# Patient Record
Sex: FEMALE | Race: WHITE | NOT HISPANIC OR LATINO | Employment: FULL TIME | URBAN - METROPOLITAN AREA
[De-identification: names, ages, dates, MRNs, and addresses within clinical notes are randomized per-mention and may not be internally consistent; named-entity substitution may affect disease eponyms.]

---

## 2020-02-17 ENCOUNTER — HOSPITAL ENCOUNTER (EMERGENCY)
Facility: HOSPITAL | Age: 35
Discharge: HOME | End: 2020-02-17
Admitting: EMERGENCY MEDICINE

## 2020-02-17 ENCOUNTER — APPOINTMENT (OUTPATIENT)
Dept: GENERAL RADIOLOGY | Facility: HOSPITAL | Age: 35
End: 2020-02-17

## 2020-02-17 VITALS
DIASTOLIC BLOOD PRESSURE: 74 MMHG | WEIGHT: 255.73 LBS | OXYGEN SATURATION: 100 % | TEMPERATURE: 97.5 F | SYSTOLIC BLOOD PRESSURE: 113 MMHG | BODY MASS INDEX: 42.61 KG/M2 | HEIGHT: 65 IN | RESPIRATION RATE: 18 BRPM | HEART RATE: 90 BPM

## 2020-02-17 DIAGNOSIS — S80.01XA CONTUSION OF RIGHT KNEE, INITIAL ENCOUNTER: ICD-10-CM

## 2020-02-17 DIAGNOSIS — S20.219A CONTUSION OF CHEST WALL, UNSPECIFIED LATERALITY, INITIAL ENCOUNTER: ICD-10-CM

## 2020-02-17 DIAGNOSIS — M25.561 ACUTE PAIN OF RIGHT KNEE: ICD-10-CM

## 2020-02-17 DIAGNOSIS — R07.89 CHEST WALL PAIN: ICD-10-CM
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
## 2020-02-17 PROCEDURE — 71045 X-RAY EXAM CHEST 1 VIEW: CPT

## 2020-02-17 PROCEDURE — 73562 X-RAY EXAM OF KNEE 3: CPT

## 2020-02-17 PROCEDURE — 99283 EMERGENCY DEPT VISIT LOW MDM: CPT

## 2020-02-17 RX ORDER — KETOROLAC TROMETHAMINE 30 MG/ML
30 INJECTION, SOLUTION INTRAMUSCULAR; INTRAVENOUS ONCE
Status: DISCONTINUED | OUTPATIENT
Start: 2020-02-17 | End: 2020-02-17

## 2020-02-17 RX ORDER — NAPROXEN 500 MG/1
500 TABLET ORAL 2 TIMES DAILY WITH MEALS
Qty: 20 TABLET | Refills: 0 | OUTPATIENT
Start: 2020-02-17 | End: 2020-04-23

## 2020-02-17 RX ORDER — CYCLOBENZAPRINE HCL 10 MG
10 TABLET ORAL 3 TIMES DAILY PRN
COMMUNITY
End: 2020-04-23

## 2020-02-17 RX ORDER — IBUPROFEN 400 MG/1
800 TABLET ORAL ONCE
Status: COMPLETED | OUTPATIENT
Start: 2020-02-17 | End: 2020-02-17

## 2020-02-17 RX ADMIN — IBUPROFEN 800 MG: 400 TABLET ORAL at 17:29
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
## 2020-02-17 NOTE — DISCHARGE INSTRUCTIONS
Apply ice and elevate right knee for 20 minutes at a time for the neck 72 hours help with pain and swelling.  You may also apply ice to your chest wall to help with pain and swelling.    Take naproxen as needed for pain do not mix with other NSAID such as ibuprofen diclofenac or Aleve.  Also take your Flexeril as needed for muscle spasms and pain.    Follow-up with your primary care provider in 3-5 days.  If you do not have a primary care provider call [phone] for help in finding one, or you may follow up with [facility] at [phone].    Return to ED for any new or worsening symptoms
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
## 2020-04-23 ENCOUNTER — HOSPITAL ENCOUNTER (EMERGENCY)
Facility: HOSPITAL | Age: 35
Discharge: HOME | End: 2020-04-23
Admitting: EMERGENCY MEDICINE

## 2020-04-23 VITALS
SYSTOLIC BLOOD PRESSURE: 115 MMHG | BODY MASS INDEX: 44.33 KG/M2 | WEIGHT: 266.1 LBS | HEIGHT: 65 IN | RESPIRATION RATE: 14 BRPM | DIASTOLIC BLOOD PRESSURE: 70 MMHG | HEART RATE: 90 BPM | TEMPERATURE: 98 F | OXYGEN SATURATION: 99 %

## 2020-04-23 DIAGNOSIS — M54.42 ACUTE LEFT-SIDED LOW BACK PAIN WITH LEFT-SIDED SCIATICA: Primary | ICD-10-CM

## 2020-04-23 LAB
BACTERIA UR QL AUTO: ABNORMAL /HPF
BILIRUB UR QL STRIP: NEGATIVE
CLARITY UR: CLEAR
COLOR UR: YELLOW
GLUCOSE UR STRIP-MCNC: NEGATIVE MG/DL
HGB UR QL STRIP.AUTO: NEGATIVE
HYALINE CASTS UR QL AUTO: ABNORMAL /LPF
KETONES UR QL STRIP: NEGATIVE
LEUKOCYTE ESTERASE UR QL STRIP.AUTO: ABNORMAL
NITRITE UR QL STRIP: NEGATIVE
PH UR STRIP.AUTO: 6.5 [PH] (ref 5–8)
PROT UR QL STRIP: NEGATIVE
RBC # UR: ABNORMAL /HPF
REF LAB TEST METHOD: ABNORMAL
SP GR UR STRIP: 1.01 (ref 1–1.03)
SQUAMOUS #/AREA URNS HPF: ABNORMAL /HPF
UROBILINOGEN UR QL STRIP: ABNORMAL
WBC UR QL AUTO: ABNORMAL /HPF

## 2020-04-23 PROCEDURE — 25010000002 ORPHENADRINE CITRATE PER 60 MG: Performed by: NURSE PRACTITIONER

## 2020-04-23 PROCEDURE — 25010000002 KETOROLAC TROMETHAMINE PER 15 MG: Performed by: NURSE PRACTITIONER

## 2020-04-23 PROCEDURE — 99283 EMERGENCY DEPT VISIT LOW MDM: CPT

## 2020-04-23 PROCEDURE — 96372 THER/PROPH/DIAG INJ SC/IM: CPT

## 2020-04-23 PROCEDURE — 81001 URINALYSIS AUTO W/SCOPE: CPT | Performed by: NURSE PRACTITIONER

## 2020-04-23 RX ORDER — METHOCARBAMOL 750 MG/1
750 TABLET, FILM COATED ORAL 3 TIMES DAILY PRN
Qty: 15 TABLET | Refills: 0 | Status: SHIPPED | OUTPATIENT
Start: 2020-04-23

## 2020-04-23 RX ORDER — ORPHENADRINE CITRATE 30 MG/ML
60 INJECTION INTRAMUSCULAR; INTRAVENOUS ONCE
Status: COMPLETED | OUTPATIENT
Start: 2020-04-23 | End: 2020-04-23

## 2020-04-23 RX ORDER — KETOROLAC TROMETHAMINE 30 MG/ML
30 INJECTION, SOLUTION INTRAMUSCULAR; INTRAVENOUS ONCE
Status: COMPLETED | OUTPATIENT
Start: 2020-04-23 | End: 2020-04-23

## 2020-04-23 RX ADMIN — ORPHENADRINE CITRATE 60 MG: 30 INJECTION INTRAMUSCULAR; INTRAVENOUS at 09:12

## 2020-04-23 RX ADMIN — KETOROLAC TROMETHAMINE 30 MG: 30 INJECTION, SOLUTION INTRAMUSCULAR at 09:11

## 2020-04-23 NOTE — DISCHARGE INSTRUCTIONS
Return to the emergency department for any new or worsening symptoms: Weakness in extremities, bowel or bladder symptoms, fever, severe pain  Ice followed by warm moist heat to the low back, 3-4 times per day 15 to 20 minutes at a time  Gentle stretching of your back and legs

## 2020-04-23 NOTE — ED PROVIDER NOTES
Subjective   Patient is an otherwise healthy 34-year-old female presents emergency department complaint of left sided low back pain rating down her left leg.  This began 3 days ago.  She denies any trauma, injury or fall.  Pain is worse with movement.  She reports tightness in her left calf, and spasm.  Reports tingling to the left leg.  No bowel or bladder symptoms. No h/o IVDA. No fever or chills.       Back Pain   Location:  Sacro-iliac joint  Quality:  Burning and shooting  Radiates to:  L posterior upper leg  Pain severity:  Moderate  Pain is:  Same all the time  Duration:  3 days  Progression:  Waxing and waning  Chronicity:  New  Context: not emotional stress, not falling, not jumping from heights, not lifting heavy objects, not MCA, not MVA, not occupational injury, not pedestrian accident, not physical stress, not recent illness, not recent injury and not twisting    Relieved by:  None tried  Worsened by:  Ambulation and movement  Ineffective treatments:  None tried  Associated symptoms: leg pain and paresthesias    Associated symptoms: no abdominal pain, no abdominal swelling, no bladder incontinence, no bowel incontinence, no chest pain, no dysuria, no fever, no headaches, no numbness, no pelvic pain, no perianal numbness, no tingling, no weakness and no weight loss    Risk factors: no hx of cancer, no hx of osteoporosis, no lack of exercise, no menopause, not obese, not pregnant, no recent surgery, no steroid use and no vascular disease        Review of Systems   Constitutional: Negative for fever and weight loss.   Respiratory: Negative for chest tightness and shortness of breath.    Cardiovascular: Negative for chest pain, palpitations and leg swelling.   Gastrointestinal: Negative for abdominal pain and bowel incontinence.   Genitourinary: Negative for bladder incontinence, dysuria, flank pain, hematuria and pelvic pain.   Musculoskeletal: Positive for back pain.   Skin: Negative.    Neurological:  Positive for paresthesias. Negative for tingling, weakness, numbness and headaches.       No past medical history on file.    Allergies   Allergen Reactions   • Adhesive Tape Hives   • Sulfa Antibiotics Hives       Past Surgical History:   Procedure Laterality Date   • ABDOMINAL SURGERY     • APPENDECTOMY         No family history on file.    Social History     Socioeconomic History   • Marital status: Significant Other     Spouse name: Not on file   • Number of children: Not on file   • Years of education: Not on file   • Highest education level: Not on file   Tobacco Use   • Smoking status: Never Smoker   Substance and Sexual Activity   • Alcohol use: Never     Frequency: Never   • Drug use: Never           Objective   Physical Exam   Constitutional: She is oriented to person, place, and time. She appears well-developed and well-nourished. She is cooperative.  Non-toxic appearance. She does not have a sickly appearance. She does not appear ill. No distress.   HENT:   Head: Normocephalic and atraumatic.   Eyes: Pupils are equal, round, and reactive to light. Conjunctivae, EOM and lids are normal.   Neck: Trachea normal, normal range of motion, full passive range of motion without pain and phonation normal. Neck supple.   Cardiovascular: Regular rhythm, S1 normal, S2 normal and normal heart sounds.   Pulmonary/Chest: Effort normal and breath sounds normal.   Abdominal: Soft. Normal appearance and bowel sounds are normal.   Musculoskeletal:        Arms:  Strength 5/5 bilateral lower extremities.   No calf tenderness upon exam.   Neurological: She is alert and oriented to person, place, and time. She has normal strength. GCS eye subscore is 4. GCS verbal subscore is 5. GCS motor subscore is 6.   Reflex Scores:       Patellar reflexes are 2+ on the right side and 2+ on the left side.  Skin: Skin is warm, dry and intact. Capillary refill takes less than 2 seconds. She is not diaphoretic.   Psychiatric: She has a normal  "mood and affect. Her speech is normal and behavior is normal. Judgment and thought content normal. Cognition and memory are normal.   Nursing note and vitals reviewed.      Procedures           ED Course  /70   Pulse 90   Temp 98 °F (36.7 °C) (Oral)   Resp 14   Ht 165.1 cm (65\")   Wt 121 kg (266 lb 1.5 oz)   SpO2 99%   BMI 44.28 kg/m²   Labs Reviewed   URINALYSIS W/ MICROSCOPIC IF INDICATED (NO CULTURE) - Abnormal; Notable for the following components:       Result Value    Leuk Esterase, UA Moderate (2+) (*)     All other components within normal limits   URINALYSIS, MICROSCOPIC ONLY - Abnormal; Notable for the following components:    RBC, UA 0-2 (*)     WBC, UA 13-20 (*)     Bacteria, UA 1+ (*)     Squamous Epithelial Cells, UA 3-6 (*)     All other components within normal limits     Medications   ketorolac (TORADOL) injection 30 mg (30 mg Intramuscular Given 4/23/20 0911)   orphenadrine (NORFLEX) injection 60 mg (60 mg Intramuscular Given 4/23/20 0912)     No radiology results for the last day    ED Course as of Apr 23 0952   Thu Apr 23, 2020   0945 Upon reexamination the patient's pain is improved.  She remains neurologically intact.    [LB]      ED Course User Index  [LB] Anneliese Man, APRN      Appropriate PPE was worn during the duration of the care for this patient while in the emergency department per [facility] guidelines                                     MDM  Number of Diagnoses or Management Options  Acute left-sided low back pain with left-sided sciatica:   Diagnosis management comments: Co morbidities: None    Differentials: Lumbosacral strain, degenerative joint disease, nephritis, ureterolithiasis, herpes zoster. This list is not all inclusive and does not constitute the entireity of considered causes.     Patient underwent the above exam and work-up. Upon re exam her pain is improved.   Patient is an otherwise healthy 34-year-old female who presents emergency " department with complaint of left-sided back pain radiating down her left leg.  She denies any specific trauma or injury to the area.  Some pain with palpation of her left lumbar paraspinal area, positive straight leg raise on the left. Neurovasculary intact.  Patient symptoms are consistent with sciatica.  UA was performed, patient is no urinary symptoms.  UA appears to be contaminated.  Will not treat at this time.  Patient will be placed on anti-inflammatory medication as well as a muscle relaxant.  I have advised her to follow-up with her primary care provider for further evaluation, and possible advanced imaging at the discretion of her PCP.    I discussed with the patient their test results today, plan of care, follow-up and return precautions.  Opportunities provided as questions.  All questions concerns were addressed at the bedside.    Patient is aware of signs and symptoms that require immediate return to the emergency department.       Amount and/or Complexity of Data Reviewed  Clinical lab tests: reviewed    Patient Progress  Patient progress: stable      Final diagnoses:   Acute left-sided low back pain with left-sided sciatica            Anneliese Man, APRN  04/23/20 0952

## 2024-05-28 ENCOUNTER — PATIENT ROUNDING (BHMG ONLY) (OUTPATIENT)
Dept: URGENT CARE | Facility: CLINIC | Age: 39
End: 2024-05-28
Payer: MEDICAID

## 2024-05-28 NOTE — ED NOTES
Thank you for letting us care for you in your recent visit to our urgent care center. We would love to hear about your experience with us. Was this the first time you have visited our location?    We’re always looking for ways to make our patients’ experiences even better. Do you have any recommendations on ways we may improve?     I appreciate you taking the time to respond. Please be on the lookout for a survey about your recent visit from [employer] via text or email. We would greatly appreciate if you could fill that out and turn it back in. We want your voice to be heard and we value your feedback.   Thank you for choosing [facility] for your healthcare needs.

## 2024-05-30 ENCOUNTER — HOSPITAL ENCOUNTER (EMERGENCY)
Facility: HOSPITAL | Age: 39
Discharge: HOME | End: 2024-05-30
Payer: MEDICAID

## 2024-05-30 ENCOUNTER — APPOINTMENT (OUTPATIENT)
Dept: CT IMAGING | Facility: HOSPITAL | Age: 39
End: 2024-05-30
Payer: MEDICAID

## 2024-05-30 VITALS
HEIGHT: 65 IN | WEIGHT: 268 LBS | OXYGEN SATURATION: 100 % | RESPIRATION RATE: 20 BRPM | BODY MASS INDEX: 44.65 KG/M2 | SYSTOLIC BLOOD PRESSURE: 129 MMHG | DIASTOLIC BLOOD PRESSURE: 92 MMHG | TEMPERATURE: 98.4 F | HEART RATE: 69 BPM

## 2024-05-30 DIAGNOSIS — K05.10 GINGIVITIS: ICD-10-CM

## 2024-05-30 DIAGNOSIS — L03.221 CELLULITIS OF NECK: ICD-10-CM

## 2024-05-30 DIAGNOSIS — L03.211 CELLULITIS OF FACE: ICD-10-CM

## 2024-05-30 DIAGNOSIS — K08.9 POOR DENTITION: ICD-10-CM

## 2024-05-30 DIAGNOSIS — K04.7 DENTAL ABSCESS: Primary | ICD-10-CM

## 2024-05-30 LAB
ALBUMIN SERPL-MCNC: 4.2 G/DL (ref 3.5–5.2)
ALBUMIN/GLOB SERPL: 1.6 G/DL
ALP SERPL-CCNC: 91 U/L (ref 39–117)
ALT SERPL W P-5'-P-CCNC: 14 U/L (ref 1–33)
ANION GAP SERPL CALCULATED.3IONS-SCNC: 9.7 MMOL/L (ref 5–15)
AST SERPL-CCNC: 13 U/L (ref 1–32)
BASOPHILS # BLD AUTO: 0.02 10*3/MM3 (ref 0–0.2)
BASOPHILS NFR BLD AUTO: 0.2 % (ref 0–1.5)
BILIRUB SERPL-MCNC: 0.2 MG/DL (ref 0–1.2)
BUN SERPL-MCNC: 12 MG/DL (ref 6–20)
BUN/CREAT SERPL: 17.1 (ref 7–25)
CALCIUM SPEC-SCNC: 9.2 MG/DL (ref 8.6–10.5)
CHLORIDE SERPL-SCNC: 102 MMOL/L (ref 98–107)
CO2 SERPL-SCNC: 26.3 MMOL/L (ref 22–29)
CREAT SERPL-MCNC: 0.7 MG/DL (ref 0.57–1)
DEPRECATED RDW RBC AUTO: 38.7 FL (ref 37–54)
EGFRCR SERPLBLD CKD-EPI 2021: 114.4 ML/MIN/1.73
EOSINOPHIL # BLD AUTO: 0.04 10*3/MM3 (ref 0–0.4)
EOSINOPHIL NFR BLD AUTO: 0.3 % (ref 0.3–6.2)
ERYTHROCYTE [DISTWIDTH] IN BLOOD BY AUTOMATED COUNT: 11.4 % (ref 12.3–15.4)
GLOBULIN UR ELPH-MCNC: 2.6 GM/DL
GLUCOSE SERPL-MCNC: 208 MG/DL (ref 65–99)
HCT VFR BLD AUTO: 37.8 % (ref 34–46.6)
HGB BLD-MCNC: 12.5 G/DL (ref 12–15.9)
IMM GRANULOCYTES # BLD AUTO: 0.03 10*3/MM3 (ref 0–0.05)
IMM GRANULOCYTES NFR BLD AUTO: 0.2 % (ref 0–0.5)
LYMPHOCYTES # BLD AUTO: 2.61 10*3/MM3 (ref 0.7–3.1)
LYMPHOCYTES NFR BLD AUTO: 21.1 % (ref 19.6–45.3)
MCH RBC QN AUTO: 30.3 PG (ref 26.6–33)
MCHC RBC AUTO-ENTMCNC: 33.1 G/DL (ref 31.5–35.7)
MCV RBC AUTO: 91.7 FL (ref 79–97)
MONOCYTES # BLD AUTO: 0.53 10*3/MM3 (ref 0.1–0.9)
MONOCYTES NFR BLD AUTO: 4.3 % (ref 5–12)
NEUTROPHILS NFR BLD AUTO: 73.9 % (ref 42.7–76)
NEUTROPHILS NFR BLD AUTO: 9.12 10*3/MM3 (ref 1.7–7)
NRBC BLD AUTO-RTO: 0 /100 WBC (ref 0–0.2)
PLATELET # BLD AUTO: 275 10*3/MM3 (ref 140–450)
PMV BLD AUTO: 9.6 FL (ref 6–12)
POTASSIUM SERPL-SCNC: 4.6 MMOL/L (ref 3.5–5.2)
PROT SERPL-MCNC: 6.8 G/DL (ref 6–8.5)
RBC # BLD AUTO: 4.12 10*6/MM3 (ref 3.77–5.28)
SODIUM SERPL-SCNC: 138 MMOL/L (ref 136–145)
WBC NRBC COR # BLD AUTO: 12.35 10*3/MM3 (ref 3.4–10.8)

## 2024-05-30 PROCEDURE — 99285 EMERGENCY DEPT VISIT HI MDM: CPT

## 2024-05-30 PROCEDURE — 25510000001 IOPAMIDOL PER 1 ML

## 2024-05-30 PROCEDURE — 80053 COMPREHEN METABOLIC PANEL: CPT

## 2024-05-30 PROCEDURE — 85025 COMPLETE CBC W/AUTO DIFF WBC: CPT

## 2024-05-30 PROCEDURE — 70491 CT SOFT TISSUE NECK W/DYE: CPT

## 2024-05-30 RX ORDER — SODIUM CHLORIDE 0.9 % (FLUSH) 0.9 %
10 SYRINGE (ML) INJECTION AS NEEDED
Status: DISCONTINUED | OUTPATIENT
Start: 2024-05-30 | End: 2024-05-31 | Stop reason: HOSPADM

## 2024-05-30 RX ADMIN — IOPAMIDOL 100 ML: 755 INJECTION, SOLUTION INTRAVENOUS at 20:59

## 2024-05-30 NOTE — ED PROVIDER NOTES
Subjective   History of Present Illness  Due to significant overcrowding in the emergency department patient was evaluated by myself in a hallway bed. This exam may be limited by privacy, noise level and the patient not wearing a hospital gown.  Explained to the patient our limitations and our overcrowding.  They were in agreement to continue the exam and treatment at this time.     38-year-old female presents to the ED with complaints of left face swelling that has spread down the left side of her neck.  States she was at urgent care 5/26 for contact dermatitis-on steroid pill, then 5/28 for this swelling of the left side of her face and again today further worsening swelling.  On the visit 5/8/2024 she was given a 1 g IM rocephin and was started on p.o. Augmentin, she took two doses yesterday and one today.  She states she was in the sun today and thought that maybe it was a sunburn that was causing the redness but was unsure.  Denies any fevers, shortness of breath, cough, chest congestion, drooling, trouble swallowing.  States that she has a dental appointment next Friday 6/7.    LMP yesterday, states no chance of pregnancy        Review of Systems   HENT:  Positive for facial swelling.        Past Medical History:   Diagnosis Date    Sciatica        Allergies   Allergen Reactions    Adhesive Tape Hives and Dermatitis    Sulfa Antibiotics Hives       Past Surgical History:   Procedure Laterality Date    ABDOMINAL SURGERY      APPENDECTOMY      SHOULDER SURGERY Right        No family history on file.    Social History     Socioeconomic History    Marital status: Significant Other   Tobacco Use    Smoking status: Never     Passive exposure: Never    Smokeless tobacco: Never   Vaping Use    Vaping status: Never Used   Substance and Sexual Activity    Alcohol use: Never    Drug use: Never    Sexual activity: Defer           Objective   Physical Exam  Vitals and nursing note reviewed.   Constitutional:        "Appearance: Normal appearance.   HENT:      Head:        Comments: Erythema and swelling to L maxillary region to the L side of neck     Mouth/Throat:      Dentition: No dental tenderness.      Comments: Gingivitis along with broke molar on L lower side  Eyes:      Extraocular Movements: Extraocular movements intact.   Cardiovascular:      Rate and Rhythm: Normal rate and regular rhythm.      Pulses: Normal pulses.      Heart sounds: Normal heart sounds.   Pulmonary:      Effort: Pulmonary effort is normal.      Breath sounds: Normal breath sounds.   Abdominal:      Palpations: Abdomen is soft.   Musculoskeletal:         General: Swelling present. No tenderness or deformity.      Cervical back: No tenderness.   Skin:     General: Skin is warm and dry.      Capillary Refill: Capillary refill takes less than 2 seconds.      Findings: Erythema present.      Comments: Erythema and swelling from the left side maxillary region down to the left side of her neck, erythema on the left side of neck well-demarcated.  No Ludewig's angina. No drooling.   Neurological:      Mental Status: She is alert and oriented to person, place, and time.   Psychiatric:         Mood and Affect: Mood normal.         Behavior: Behavior normal.         Thought Content: Thought content normal.         Judgment: Judgment normal.         Procedures           ED Course  ED Course as of 05/30/24 2206   Thu May 30, 2024   2011 Marked ready for CT [KB]      ED Course User Index  [KB] Bhavani Peterson APRN      /92 (BP Location: Left arm, Patient Position: Sitting)   Pulse 69   Temp 98.4 °F (36.9 °C) (Oral)   Resp 20   Ht 165.1 cm (65\")   Wt 122 kg (268 lb)   LMP 05/25/2024 (Exact Date)   SpO2 100%   BMI 44.60 kg/m²   Labs Reviewed   COMPREHENSIVE METABOLIC PANEL - Abnormal; Notable for the following components:       Result Value    Glucose 208 (*)     All other components within normal limits    Narrative:     GFR Normal >60  Chronic " Kidney Disease <60  Kidney Failure <15     CBC WITH AUTO DIFFERENTIAL - Abnormal; Notable for the following components:    WBC 12.35 (*)     RDW 11.4 (*)     Monocyte % 4.3 (*)     Neutrophils, Absolute 9.12 (*)     All other components within normal limits   CBC AND DIFFERENTIAL    Narrative:     The following orders were created for panel order CBC & Differential.  Procedure                               Abnormality         Status                     ---------                               -----------         ------                     CBC Auto Differential[[id number]]        Abnormal            Final result                 Please view results for these tests on the individual orders.     Medications   sodium chloride 0.9 % flush 10 mL (has no administration in time range)   iopamidol (ISOVUE-370) 76 % injection 100 mL (100 mL Intravenous Given 5/30/24 2059)     CT Soft Tissue Neck With Contrast    Result Date: 5/30/2024  Soft tissue swelling of the left face related to multiple dental caries. Periapical abscess of the posterior most left mandibular molar and multiple additional left-sided left-sided mandibular molars. No soft tissue abscesses. Electronically Signed: Gagan Will MD  5/30/2024 9:10 PM EDT  Workstation ID: [id number]                                          Medical Decision Making  Pt presenting with worsening erythema and swelling to the left side of her face extending to L side of neck. Pt seen at urgent care 5/28 an today for this- was told to come to the ER. Was given 1gIM rocephin and started on Augmentin PO. PT stated the erythema got worse after being out in the sun today without sunscreen, but the redness was well demarcated, not throughout. Denied pain or fevers. Pt also has poor dentition and a broken tooth on the L lower side. IV was established and blood work was obtained to assess for infection. This was fairly normal, white blood cell count mildly elevated at 12.35, glucose 208 however  patient is on steroids. CT soft tissue of neck ordered to assess for possible abscess. This was independently interpreted by the radiologist as soft tissue swelling of the left face related to multiple dental caries. Periapical abscess of the posterior most left mandibular molar and multiple additional left-sided left-sided mandibular molars. No soft tissue abscesses.  Patient is afebrile, no tachycardia noted, denies pain.  Patient does have a follow-up with dentistry next Friday 6/7/24 along with PCP.  Discussed case with Dr. London who is agreeable that patient will be able to follow-up outpatient.  Continue her antibiotics as prescribed.  Patient verbalized understanding was agreeable with disposition.    I discussed findings with patient who voices understanding of discharge instructions, signs and symptoms requiring return to ED; discharged improved and in stable condition with follow up for re-evaluation.  This document is intended for medical expert use only. Reading of this document by patients and/or patient's family without participating medical staff guidance may result in misinterpretation and unintended morbidity.  Any interpretation of such data is the responsibility of the patient and/or family member responsible for the patient in concert with their primary or specialist providers, not to be left for sources of online searches such as [employer], [employer] or similar queries. Relying on these approaches to knowledge may result in misinterpretation, misguided goals of care and even death should patients or family members try recommendations outside of the realm of professional medical care in a supervised inpatient environment.     Problems Addressed:  Cellulitis of face: complicated acute illness or injury  Cellulitis of neck: complicated acute illness or injury  Gingivitis: complicated acute illness or injury  Poor dentition: complicated acute illness or injury    Amount and/or Complexity of Data  Reviewed  Labs: ordered.  Radiology: ordered and independent interpretation performed. Decision-making details documented in ED Course.  ECG/medicine tests: ordered and independent interpretation performed. Decision-making details documented in ED Course.    Risk  OTC drugs.  Prescription drug management.        Final diagnoses:   Poor dentition   Gingivitis   Cellulitis of face   Cellulitis of neck   Dental abscess       ED Disposition  ED Disposition       ED Disposition   Discharge    Condition   Stable    Comment   --               Anthony Sullivan MD  [address] [place] [address]  [phone]      Please go to your appointment on June 7, 2024         Medication List      No changes were made to your prescriptions during this visit.            Bhavani Peterson, APRN  05/31/24 0134

## 2024-05-30 NOTE — Clinical Note
[facility] EMERGENCY DEPARTMENT  [address] [place] [address]  Phone: [phone]    Rhonda Teixeira was seen and treated in our emergency department on 5/30/2024.  She may return to work on 06/01/2024.         Thank you for choosing [facility].    Bhavani Peterson APRN

## 2024-05-31 NOTE — ED NOTES
Pt states someone noticed redness on left side of her face today; pt denies pain or toothache at this time; pt states she is on abx & steroid & has dentist appointment soon;

## 2024-05-31 NOTE — DISCHARGE INSTRUCTIONS
Please continue to watch for signs symptoms of worsening infection, return to the ER with any shortness of breath, trouble swallowing, drooling, etc.    Continue to take all medication, including antibiotic, as prescribed, continue to follow pharmacy warning precautions    Continue to follow-up with your appointment with dentistry and primary care on 6/7/2024